# Patient Record
Sex: MALE | Race: WHITE | URBAN - METROPOLITAN AREA
[De-identification: names, ages, dates, MRNs, and addresses within clinical notes are randomized per-mention and may not be internally consistent; named-entity substitution may affect disease eponyms.]

---

## 2018-06-07 ENCOUNTER — HOSPITAL ENCOUNTER (OUTPATIENT)
Dept: GENERAL RADIOLOGY | Age: 38
Discharge: HOME | End: 2018-06-07
Attending: NURSE PRACTITIONER
Payer: COMMERCIAL

## 2018-06-07 ENCOUNTER — OFFICE VISIT (OUTPATIENT)
Dept: FAMILY MEDICINE CLINIC | Facility: CLINIC | Age: 38
End: 2018-06-07

## 2018-06-07 VITALS
HEIGHT: 73 IN | DIASTOLIC BLOOD PRESSURE: 62 MMHG | HEART RATE: 68 BPM | WEIGHT: 225.19 LBS | TEMPERATURE: 97 F | BODY MASS INDEX: 29.84 KG/M2 | SYSTOLIC BLOOD PRESSURE: 110 MMHG

## 2018-06-07 DIAGNOSIS — R07.81 RIB PAIN ON RIGHT SIDE: ICD-10-CM

## 2018-06-07 DIAGNOSIS — R07.81 RIB PAIN ON RIGHT SIDE: Primary | ICD-10-CM

## 2018-06-07 PROBLEM — S43.004A DISLOCATION OF RIGHT SHOULDER JOINT: Status: ACTIVE | Noted: 2018-06-07

## 2018-06-07 PROBLEM — S43.005A DISLOCATION OF LEFT SHOULDER JOINT: Status: ACTIVE | Noted: 2018-06-07

## 2018-06-07 PROCEDURE — 99202 OFFICE O/P NEW SF 15 MIN: CPT | Performed by: NURSE PRACTITIONER

## 2018-06-07 PROCEDURE — 71100 X-RAY EXAM RIBS UNI 2 VIEWS: CPT | Performed by: NURSE PRACTITIONER

## 2018-06-07 RX ORDER — ACETAMINOPHEN AND CODEINE PHOSPHATE 300; 30 MG/1; MG/1
1 TABLET ORAL EVERY 4 HOURS PRN
Qty: 30 TABLET | Refills: 0 | Status: SHIPPED | OUTPATIENT
Start: 2018-06-07

## 2018-06-07 RX ORDER — CETIRIZINE HYDROCHLORIDE 10 MG/1
10 TABLET ORAL DAILY
COMMUNITY

## 2018-06-07 NOTE — PROGRESS NOTES
HPI:    Patient ID: Ba Perez is a 43year old male. HPI     Betty Groves on Sunday when trying to relocate his left shoulder. Landed on right side - mainly the rib. Knocked the breath out. Been taking ibuprofen 400 mg three times a day.    No cough exce No orders of the defined types were placed in this encounter. Meds This Visit:  Signed Prescriptions Disp Refills    Acetaminophen-Codeine #3 300-30 MG Oral Tab 30 tablet 0      Sig: Take 1 tablet by mouth every 4 (four) hours as needed for Pain.

## 2018-06-08 NOTE — PATIENT INSTRUCTIONS
Rib x-ray: Pending radiology report   Given Prescription for tylenol #3 to fill if needed. Encouraged to take deep breaths and cough to prevent pneumonia. Return to clinic if not improving or worsens.

## 2021-10-18 ENCOUNTER — TELEPHONE (OUTPATIENT)
Dept: FAMILY MEDICINE CLINIC | Facility: CLINIC | Age: 41
End: 2021-10-18

## 2021-10-18 NOTE — TELEPHONE ENCOUNTER
Spoke with pt's mother Kellie Kam- recommendations given for testing in [facility] for [employer] -on [employer], also [place], or [address] verbalized understanding.

## 2021-10-18 NOTE — TELEPHONE ENCOUNTER
Needs COVID order to be tested for Travel. Traveling 11/11/2021, so test has to be done 11/8/2021 or after.